# Patient Record
Sex: FEMALE | Race: WHITE | Employment: FULL TIME | URBAN - METROPOLITAN AREA
[De-identification: names, ages, dates, MRNs, and addresses within clinical notes are randomized per-mention and may not be internally consistent; named-entity substitution may affect disease eponyms.]

---

## 2019-06-24 ENCOUNTER — HOSPITAL ENCOUNTER (EMERGENCY)
Age: 54
Discharge: HOME | End: 2019-06-24
Attending: STUDENT IN AN ORGANIZED HEALTH CARE EDUCATION/TRAINING PROGRAM
Payer: COMMERCIAL

## 2019-06-24 ENCOUNTER — APPOINTMENT (OUTPATIENT)
Dept: GENERAL RADIOLOGY | Age: 54
End: 2019-06-24
Attending: STUDENT IN AN ORGANIZED HEALTH CARE EDUCATION/TRAINING PROGRAM
Payer: COMMERCIAL

## 2019-06-24 VITALS
WEIGHT: 207.23 LBS | RESPIRATION RATE: 16 BRPM | TEMPERATURE: 98.2 F | HEART RATE: 55 BPM | OXYGEN SATURATION: 99 % | SYSTOLIC BLOOD PRESSURE: 122 MMHG | DIASTOLIC BLOOD PRESSURE: 74 MMHG

## 2019-06-24 DIAGNOSIS — S39.012A BACK STRAIN, INITIAL ENCOUNTER: Primary | ICD-10-CM

## 2019-06-24 PROCEDURE — 72072 X-RAY EXAM THORAC SPINE 3VWS: CPT

## 2019-06-24 PROCEDURE — 74011250637 HC RX REV CODE- 250/637: Performed by: STUDENT IN AN ORGANIZED HEALTH CARE EDUCATION/TRAINING PROGRAM

## 2019-06-24 PROCEDURE — 99283 EMERGENCY DEPT VISIT LOW MDM: CPT

## 2019-06-24 RX ORDER — ACETAMINOPHEN 500 MG
1000 TABLET ORAL ONCE
Status: COMPLETED | OUTPATIENT
Start: 2019-06-24 | End: 2019-06-24

## 2019-06-24 RX ORDER — LIDOCAINE 50 MG/G
PATCH TOPICAL
Qty: 5 EACH | Refills: 0 | Status: SHIPPED | OUTPATIENT
Start: 2019-06-24

## 2019-06-24 RX ORDER — IBUPROFEN 400 MG/1
800 TABLET ORAL ONCE
Status: COMPLETED | OUTPATIENT
Start: 2019-06-24 | End: 2019-06-24

## 2019-06-24 RX ADMIN — ACETAMINOPHEN 1000 MG: 500 TABLET ORAL at 07:49

## 2019-06-24 RX ADMIN — IBUPROFEN 800 MG: 400 TABLET ORAL at 07:49

## 2019-06-24 NOTE — ED TRIAGE NOTES
Patient arrives with c/o upper back pain onset this morning after getting out of the shoulder. Patient states she felt something go \"snap\". Patient also c/o left shoulder pain.

## 2019-06-24 NOTE — DISCHARGE INSTRUCTIONS

## 2019-06-24 NOTE — ED PROVIDER NOTES
The history is provided by the patient and the spouse. Shoulder Pain    The incident occurred 1 to 2 hours ago. The incident occurred at home (was drying self after shower, heard a \"crack\" in her L upper back, underneath shoulder blade. ). Injury mechanism: drying self with towel. The left (shoulder blade) shoulder is affected. The pain is mild. The pain has been constant since onset. The pain radiates (down L arm). There is no history of shoulder injury. She has no other injuries. There is no history of shoulder surgery. Associated symptoms include numbness (in L arm). Pertinent negatives include no muscle weakness and no tingling. History reviewed. No pertinent past medical history. Past Surgical History:   Procedure Laterality Date    HX  SECTION      2         History reviewed. No pertinent family history. Social History     Socioeconomic History    Marital status:      Spouse name: Not on file    Number of children: Not on file    Years of education: Not on file    Highest education level: Not on file   Occupational History    Not on file   Social Needs    Financial resource strain: Not on file    Food insecurity:     Worry: Not on file     Inability: Not on file    Transportation needs:     Medical: Not on file     Non-medical: Not on file   Tobacco Use    Smoking status: Never Smoker    Smokeless tobacco: Never Used   Substance and Sexual Activity    Alcohol use:  Yes    Drug use: Never    Sexual activity: Not on file   Lifestyle    Physical activity:     Days per week: Not on file     Minutes per session: Not on file    Stress: Not on file   Relationships    Social connections:     Talks on phone: Not on file     Gets together: Not on file     Attends Mandaeism service: Not on file     Active member of club or organization: Not on file     Attends meetings of clubs or organizations: Not on file     Relationship status: Not on file    Intimate partner violence: Fear of current or ex partner: Not on file     Emotionally abused: Not on file     Physically abused: Not on file     Forced sexual activity: Not on file   Other Topics Concern    Not on file   Social History Narrative    Not on file         ALLERGIES: Patient has no known allergies. Review of Systems   Constitutional: Negative for fever. Respiratory: Negative for cough and shortness of breath. Cardiovascular: Negative for chest pain. Gastrointestinal: Negative for abdominal pain. Musculoskeletal: Positive for arthralgias (L shoulder) and back pain (L upper back). Negative for gait problem, joint swelling and neck pain. Skin: Negative for color change. Neurological: Positive for numbness (in L arm). Negative for tingling, syncope and weakness. All other systems reviewed and are negative. Vitals:    06/24/19 0724   Weight: 94 kg (207 lb 3.7 oz)            Physical Exam   Constitutional: She is oriented to person, place, and time. She appears well-developed. No distress. HENT:   Head: Normocephalic and atraumatic. Eyes: Conjunctivae and EOM are normal.   Neck: Normal range of motion. Neck supple. Cardiovascular: Normal rate, regular rhythm and normal heart sounds. Pulmonary/Chest: Effort normal and breath sounds normal. No respiratory distress. Abdominal: Soft. There is no tenderness. There is no guarding. Musculoskeletal: Normal range of motion. She exhibits tenderness (reproducible TTP over L paraspinal muscle near T3. No bony TTP or stepoffs. ). She exhibits no edema or deformity. Neurological: She is alert and oriented to person, place, and time. She exhibits normal muscle tone. Skin: Skin is warm and dry. MDM       Procedures    A/P: 49 yo F with no sig PMH who presents after injury her upper back while drying herself after a shower. No real shoulder abnormality. Clearly not a cardiac event. Exam c/w with muscular injury/strain.  Will XR to make sure no occult or pathologic fracture. Dc if reassuring with RICE therapy and outpatient follow up as [address] stabilized today.

## 2024-11-07 ENCOUNTER — HOSPITAL ENCOUNTER (OUTPATIENT)
Facility: HOSPITAL | Age: 59
Discharge: HOME | End: 2024-11-10

## 2024-11-07 DIAGNOSIS — E78.2 MIXED HYPERLIPIDEMIA: ICD-10-CM

## 2024-11-07 PROCEDURE — 75571 CT HRT W/O DYE W/CA TEST: CPT
